# Patient Record
Sex: FEMALE | Race: WHITE | Employment: FULL TIME | URBAN - METROPOLITAN AREA
[De-identification: names, ages, dates, MRNs, and addresses within clinical notes are randomized per-mention and may not be internally consistent; named-entity substitution may affect disease eponyms.]

---

## 2017-10-03 ENCOUNTER — HOSPITAL ENCOUNTER (OUTPATIENT)
Dept: ULTRASOUND IMAGING | Age: 32
Discharge: HOME | End: 2017-10-03
Attending: OBSTETRICS & GYNECOLOGY
Payer: COMMERCIAL

## 2017-10-03 DIAGNOSIS — O20.0 ABORTION, THREATENED: ICD-10-CM

## 2017-10-03 PROCEDURE — 76801 OB US < 14 WKS SINGLE FETUS: CPT | Performed by: OBSTETRICS & GYNECOLOGY

## 2017-10-03 PROCEDURE — 76817 TRANSVAGINAL US OBSTETRIC: CPT | Performed by: OBSTETRICS & GYNECOLOGY

## 2017-10-11 ENCOUNTER — HOSPITAL ENCOUNTER (OUTPATIENT)
Dept: ULTRASOUND IMAGING | Age: 32
Discharge: HOME | End: 2017-10-11
Attending: OBSTETRICS & GYNECOLOGY
Payer: COMMERCIAL

## 2017-10-11 DIAGNOSIS — O20.0 ABORTION, SPONTANEOUS THREATENED: ICD-10-CM

## 2017-10-11 PROCEDURE — 76817 TRANSVAGINAL US OBSTETRIC: CPT | Performed by: OBSTETRICS & GYNECOLOGY

## 2017-10-11 PROCEDURE — 76801 OB US < 14 WKS SINGLE FETUS: CPT | Performed by: OBSTETRICS & GYNECOLOGY

## 2017-10-26 ENCOUNTER — HOSPITAL ENCOUNTER (EMERGENCY)
Age: 32
Discharge: HOME | End: 2017-10-27
Attending: EMERGENCY MEDICINE
Payer: COMMERCIAL

## 2017-10-26 DIAGNOSIS — N93.9 VAGINAL BLEEDING: Primary | ICD-10-CM

## 2017-10-26 PROCEDURE — 86900 BLOOD TYPING SEROLOGIC ABO: CPT | Performed by: EMERGENCY MEDICINE

## 2017-10-26 PROCEDURE — 99284 EMERGENCY DEPT VISIT MOD MDM: CPT

## 2017-10-26 PROCEDURE — 85025 COMPLETE CBC W/AUTO DIFF WBC: CPT | Performed by: EMERGENCY MEDICINE

## 2017-10-26 PROCEDURE — 96361 HYDRATE IV INFUSION ADD-ON: CPT

## 2017-10-26 PROCEDURE — 84702 CHORIONIC GONADOTROPIN TEST: CPT | Performed by: EMERGENCY MEDICINE

## 2017-10-26 PROCEDURE — 86901 BLOOD TYPING SEROLOGIC RH(D): CPT | Performed by: EMERGENCY MEDICINE

## 2017-10-26 PROCEDURE — 96360 HYDRATION IV INFUSION INIT: CPT

## 2017-10-26 RX ORDER — SODIUM CHLORIDE 9 MG/ML
125 INJECTION, SOLUTION INTRAVENOUS CONTINUOUS
Status: DISCONTINUED | OUTPATIENT
Start: 2017-10-26 | End: 2017-10-27

## 2017-10-27 ENCOUNTER — APPOINTMENT (OUTPATIENT)
Dept: ULTRASOUND IMAGING | Age: 32
End: 2017-10-27
Attending: EMERGENCY MEDICINE
Payer: COMMERCIAL

## 2017-10-27 VITALS
DIASTOLIC BLOOD PRESSURE: 57 MMHG | WEIGHT: 150 LBS | HEART RATE: 76 BPM | TEMPERATURE: 99 F | SYSTOLIC BLOOD PRESSURE: 95 MMHG | RESPIRATION RATE: 16 BRPM | BODY MASS INDEX: 24 KG/M2 | OXYGEN SATURATION: 100 %

## 2017-10-27 PROCEDURE — 76801 OB US < 14 WKS SINGLE FETUS: CPT | Performed by: EMERGENCY MEDICINE

## 2017-10-27 NOTE — ED PROVIDER NOTES
Patient Seen in: THE [facility] Emergency Department In [place]    History   Patient presents with:  Pregnancy Issues (gynecologic)    Stated Complaint: 10 weeks pregnant, bleeding    HPI    This is a 13-year-old G2 , 10 weeks pregnant by ultrasound date Physical Exam    GENERAL: Awake, alert oriented x3, nontoxic appearing. SKIN: Normal, warm, and dry. HEENT:  Pupils equally round and reactive to light. Conjuctiva clear.   Oropharynx is clear and moist.   Lungs: Clear to auscultation bilaterally Eleanor Levy who agrees with current management. She will be on pelvic rest.  Discharged home. Return if worse. Follow-up tomorrow morning the office as scheduled. Patient was discharged home in good condition with her .       Disposition and Plan

## 2017-12-04 ENCOUNTER — OFFICE VISIT (OUTPATIENT)
Dept: PERINATAL CARE | Facility: HOSPITAL | Age: 32
End: 2017-12-04
Attending: OBSTETRICS & GYNECOLOGY
Payer: COMMERCIAL

## 2017-12-04 VITALS
SYSTOLIC BLOOD PRESSURE: 113 MMHG | BODY MASS INDEX: 25.71 KG/M2 | HEIGHT: 66 IN | DIASTOLIC BLOOD PRESSURE: 64 MMHG | HEART RATE: 77 BPM | WEIGHT: 160 LBS

## 2017-12-04 DIAGNOSIS — Z3A.15 15 WEEKS GESTATION OF PREGNANCY: ICD-10-CM

## 2017-12-04 DIAGNOSIS — Z20.821 ZIKA VIRUS EXPOSURE: ICD-10-CM

## 2017-12-04 PROCEDURE — 99242 OFF/OP CONSLTJ NEW/EST SF 20: CPT | Performed by: OBSTETRICS & GYNECOLOGY

## 2017-12-04 NOTE — PROGRESS NOTES
Vernita Eisenmenger is a 33year old female. Reason for Consult:   Possible zika exposure. Traveled to [facility] about 10wks ago. Does not remember mosquito bites. No rash, fever or illness.     Review of History:     OB History:    Obstetric History   G3 1 per week     Comment: 3 drinks a month       NARRATIVE:     /64   Pulse 77   Ht 5' 6\" (1.676 m)   Wt 160 lb (72.6 kg)   LMP 08/17/2017   BMI 25.82 kg/m²           Alert and Oriented.   No acute distress           Zika virus may be transmitted to hu travelers returning to the [facility] from an area with [place] should take steps to prevent mosquito bites for 3 weeks so they do not spread Zika to uninfected mosquitoes.  Based on experience with other flaviviruses, IgM antibodies will be expected to be an area where mosquito-borne transmission of Zika virus infection has been reported       Current Public Health guidelines for Zika testing in pregnancy include:     For asymptomatic pregnant women who have traveled to areas with [facility] travel notices, RN should consider RNA ESTEFANÍA testing of amniocentesis specimens and results should be interpreted within the context of the limitations of amniotic fluid testing.      [facility]  CDC recommends Zika virus testing for anyone who is not pr

## 2017-12-29 NOTE — PROGRESS NOTES
Lydia Shields    Dear Dr. Juancarlos Keyes    Thank you for requesting ultrasound evaluation and maternal fetal medicine consultation on your patient Michelle Triana.   As you are aware she is a 33year old female  with a [employer] [place] [employer] Placenta: anterior. Fetal Anatomy:  Visualized with normal appearance: head, face, spine, neck, skin, chest, abdominal wall, gastrointestinal tract, kidneys, bladder, extremities.     Brain: Visualized and normal appearance: brain parenchyma, cerebral assess growth. If fetal growth restriction is detected, additional fetal monitoring is indicated.      IMPRESSION:  · IUP at 19w5d  · Possible Zika Virus Exposure - normal fetal CNS anatomy  · Single Umbilical Artery    RECOMMENDATIONS:  · Continue care wi

## 2018-01-02 ENCOUNTER — OFFICE VISIT (OUTPATIENT)
Dept: PERINATAL CARE | Facility: HOSPITAL | Age: 33
End: 2018-01-02
Attending: OBSTETRICS & GYNECOLOGY
Payer: COMMERCIAL

## 2018-01-02 VITALS
SYSTOLIC BLOOD PRESSURE: 118 MMHG | HEART RATE: 85 BPM | DIASTOLIC BLOOD PRESSURE: 68 MMHG | WEIGHT: 165 LBS | BODY MASS INDEX: 27 KG/M2

## 2018-01-02 DIAGNOSIS — Z20.821 ZIKA VIRUS EXPOSURE AFFECTING PREGNANCY: ICD-10-CM

## 2018-01-02 DIAGNOSIS — O99.891 ZIKA VIRUS EXPOSURE AFFECTING PREGNANCY: ICD-10-CM

## 2018-01-02 PROCEDURE — 76811 OB US DETAILED SNGL FETUS: CPT | Performed by: OBSTETRICS & GYNECOLOGY

## 2018-01-02 PROCEDURE — 99215 OFFICE O/P EST HI 40 MIN: CPT | Performed by: OBSTETRICS & GYNECOLOGY

## 2018-03-26 NOTE — PROGRESS NOTES
[facility]  Dear Dr. Irena Douglas     Thank you for requesting ultrasound evaluation and maternal fetal medicine consultation on your patient Kiah Antunez.   As you are aware she is a 33year old female  with a Si tract, kidneys, bladder. Brain: Visualized and normal appearance: brain parenchyma, choroid plexus, cerebellum, posterior fossa, cavum septi pellucidi.   Spine: appears normal but suboptimal  Heart: Visualized and normal appearance: four-chamber view, le

## 2018-03-27 ENCOUNTER — OFFICE VISIT (OUTPATIENT)
Dept: PERINATAL CARE | Facility: HOSPITAL | Age: 33
End: 2018-03-27
Attending: OBSTETRICS & GYNECOLOGY
Payer: COMMERCIAL

## 2018-03-27 VITALS
DIASTOLIC BLOOD PRESSURE: 66 MMHG | SYSTOLIC BLOOD PRESSURE: 120 MMHG | HEART RATE: 93 BPM | BODY MASS INDEX: 28 KG/M2 | WEIGHT: 175 LBS

## 2018-03-27 DIAGNOSIS — O99.891 ZIKA VIRUS EXPOSURE AFFECTING PREGNANCY: ICD-10-CM

## 2018-03-27 DIAGNOSIS — Z20.821 ZIKA VIRUS EXPOSURE AFFECTING PREGNANCY: ICD-10-CM

## 2018-03-27 DIAGNOSIS — O09.899 SINGLE UMBILICAL ARTERY AFFECTING MANAGEMENT OF MOTHER IN SINGLETON PREGNANCY, ANTEPARTUM: ICD-10-CM

## 2018-03-27 PROCEDURE — 76805 OB US >/= 14 WKS SNGL FETUS: CPT | Performed by: OBSTETRICS & GYNECOLOGY

## 2018-03-27 PROCEDURE — 99213 OFFICE O/P EST LOW 20 MIN: CPT | Performed by: OBSTETRICS & GYNECOLOGY

## 2018-03-27 PROCEDURE — 76819 FETAL BIOPHYS PROFIL W/O NST: CPT | Performed by: OBSTETRICS & GYNECOLOGY

## 2018-04-17 ENCOUNTER — HOSPITAL ENCOUNTER (OUTPATIENT)
Facility: HOSPITAL | Age: 33
Setting detail: OBSERVATION
Discharge: HOME | End: 2018-04-17
Attending: OBSTETRICS & GYNECOLOGY | Admitting: OBSTETRICS & GYNECOLOGY
Payer: COMMERCIAL

## 2018-04-17 VITALS
DIASTOLIC BLOOD PRESSURE: 66 MMHG | TEMPERATURE: 98 F | BODY MASS INDEX: 29.99 KG/M2 | WEIGHT: 180 LBS | HEIGHT: 65 IN | SYSTOLIC BLOOD PRESSURE: 110 MMHG | HEART RATE: 88 BPM

## 2018-04-17 PROCEDURE — 81001 URINALYSIS AUTO W/SCOPE: CPT | Performed by: OBSTETRICS & GYNECOLOGY

## 2018-04-17 PROCEDURE — 96372 THER/PROPH/DIAG INJ SC/IM: CPT

## 2018-04-17 RX ORDER — TERBUTALINE SULFATE 1 MG/ML
0.25 INJECTION, SOLUTION SUBCUTANEOUS
Status: DISPENSED | OUTPATIENT
Start: 2018-04-17 | End: 2018-04-17

## 2018-04-17 NOTE — PROGRESS NOTES
Pt is a 33year old female admitted to [facility]. Patient presents with:  R/o  Labor     Pt is  34w5d intra-uterine pregnancy. History obtained, consents signed. Oriented to room, staff, and plan of care.

## 2018-04-18 NOTE — DISCHARGE SUMMARY
[facility]    PATIENT'S NAME: Griselda Slough D   ATTENDING PHYSICIAN: Tierney Jaramillo D.O.   PATIENT ACCOUNT#:   [de-identified]    LOCATION:  [address] [place] [address]  MEDICAL RECORD #:   [id number]       YOB: 1985  ADMISSION DATE:       04/17/20

## 2018-04-20 ENCOUNTER — HOSPITAL ENCOUNTER (OUTPATIENT)
Facility: HOSPITAL | Age: 33
Setting detail: OBSERVATION
Discharge: HOME | End: 2018-04-20
Attending: OBSTETRICS & GYNECOLOGY | Admitting: OBSTETRICS & GYNECOLOGY
Payer: COMMERCIAL

## 2018-04-20 VITALS
OXYGEN SATURATION: 98 % | HEIGHT: 65 IN | DIASTOLIC BLOOD PRESSURE: 64 MMHG | TEMPERATURE: 98 F | SYSTOLIC BLOOD PRESSURE: 103 MMHG | HEART RATE: 90 BPM | BODY MASS INDEX: 30.82 KG/M2 | WEIGHT: 185 LBS

## 2018-04-20 PROCEDURE — 59025 FETAL NON-STRESS TEST: CPT

## 2018-04-20 PROCEDURE — 81002 URINALYSIS NONAUTO W/O SCOPE: CPT

## 2018-04-20 PROCEDURE — 96360 HYDRATION IV INFUSION INIT: CPT

## 2018-04-20 PROCEDURE — 96372 THER/PROPH/DIAG INJ SC/IM: CPT

## 2018-04-20 RX ORDER — NIFEDIPINE 10 MG/1
10 CAPSULE ORAL EVERY 4 HOURS
Status: ON HOLD | COMMUNITY
End: 2018-05-04

## 2018-04-20 RX ORDER — BETAMETHASONE SODIUM PHOSPHATE AND BETAMETHASONE ACETATE 3; 3 MG/ML; MG/ML
12 INJECTION, SUSPENSION INTRA-ARTICULAR; INTRALESIONAL; INTRAMUSCULAR; SOFT TISSUE EVERY 24 HOURS
Status: DISCONTINUED | OUTPATIENT
Start: 2018-04-20 | End: 2018-04-20

## 2018-04-20 RX ORDER — TERBUTALINE SULFATE 1 MG/ML
0.25 INJECTION, SOLUTION SUBCUTANEOUS ONCE
Status: COMPLETED | OUTPATIENT
Start: 2018-04-20 | End: 2018-04-20

## 2018-04-21 ENCOUNTER — HOSPITAL ENCOUNTER (OUTPATIENT)
Facility: HOSPITAL | Age: 33
Discharge: HOME | End: 2018-04-21
Attending: OBSTETRICS & GYNECOLOGY | Admitting: OBSTETRICS & GYNECOLOGY
Payer: COMMERCIAL

## 2018-04-21 PROCEDURE — 96372 THER/PROPH/DIAG INJ SC/IM: CPT

## 2018-04-21 RX ORDER — BETAMETHASONE SODIUM PHOSPHATE AND BETAMETHASONE ACETATE 3; 3 MG/ML; MG/ML
12 INJECTION, SUSPENSION INTRA-ARTICULAR; INTRALESIONAL; INTRAMUSCULAR; SOFT TISSUE ONCE
Status: COMPLETED | OUTPATIENT
Start: 2018-04-21 | End: 2018-04-21

## 2018-04-21 NOTE — PROGRESS NOTES
Cervical exam - closed /50%/-3 station posterior, pt did not have contraction last hour, pt feeling comfortable . Updated  orders received to discharge pt home and come to triage tomorrow for 2nd dose of steriod.

## 2018-04-21 NOTE — NST
Nonstress Test   Patient: Maddison Meadows    Gestation: 35w1d    NST:       Variability: Moderate           Accelerations: Yes           Decelerations: None            Baseline: 140 BPM           Uterine Irritability: Yes           Contractions: Priscilla Willoughby

## 2018-04-21 NOTE — PROGRESS NOTES
Discharge instruction given to pt , pt going home ambulatory accompanied with spouse in stable condition, all pt belongings sent with pt on discharge

## 2018-04-21 NOTE — PROGRESS NOTES
Pt is   35.1 GA sent from office by Andres Boxer for  contraction evaluation  EFM and Hector applied, pt says her contraction is very mild compared to earlier today.   Updated  about the pt status, orders received for IV hydration, terb x 1

## 2018-04-22 NOTE — PROGRESS NOTES
Pt in triage for second betamethasone shot. Pt denies any pregnancy concerns at this time. Betamethasone education reviewed with patient. Pt given IM shot  of betamethasone in right upper out gluteus. Pt left ambulatory.

## 2018-05-04 ENCOUNTER — APPOINTMENT (OUTPATIENT)
Dept: ULTRASOUND IMAGING | Facility: HOSPITAL | Age: 33
End: 2018-05-04
Attending: OBSTETRICS & GYNECOLOGY
Payer: COMMERCIAL

## 2018-05-04 ENCOUNTER — HOSPITAL ENCOUNTER (OUTPATIENT)
Facility: HOSPITAL | Age: 33
Setting detail: OBSERVATION
Discharge: HOME | End: 2018-05-04
Attending: OBSTETRICS & GYNECOLOGY | Admitting: OBSTETRICS & GYNECOLOGY
Payer: COMMERCIAL

## 2018-05-04 VITALS
HEIGHT: 65 IN | TEMPERATURE: 98 F | RESPIRATION RATE: 16 BRPM | BODY MASS INDEX: 31.65 KG/M2 | DIASTOLIC BLOOD PRESSURE: 69 MMHG | HEART RATE: 86 BPM | SYSTOLIC BLOOD PRESSURE: 120 MMHG | WEIGHT: 190 LBS

## 2018-05-04 PROCEDURE — 76819 FETAL BIOPHYS PROFIL W/O NST: CPT | Performed by: OBSTETRICS & GYNECOLOGY

## 2018-05-04 PROCEDURE — 59025 FETAL NON-STRESS TEST: CPT

## 2018-05-04 PROCEDURE — 81002 URINALYSIS NONAUTO W/O SCOPE: CPT

## 2018-05-04 NOTE — NST
Nonstress Test   Patient: Kalyan Hurd    Gestation: 37w1d    NST:       Variability: Moderate           Accelerations: Yes           Decelerations: None            Baseline: 120 BPM           Uterine Irritability: No           Contractions: Regular

## 2018-05-04 NOTE — PROGRESS NOTES
Pt is a  at 37 1/7 wk iup who is brought to room triage-5 for BPP and NST. Pt was in office today for growth u/s and BPP was noted to be 6. Pt sent to L&D for further eval. UA obtained. EFM tested and applied. POC discussed and questions answered.  Pt

## 2018-05-04 NOTE — PROGRESS NOTES
Pt is d/c'd home in stable condition. Both written and verbal instructions provided. Kick counts reviewed. Pt instructed to call if any concerns regarding fetal movement. Questions answered. Pt verbalizes understanding and agreement.

## 2018-05-11 ENCOUNTER — HOSPITAL ENCOUNTER (INPATIENT)
Facility: HOSPITAL | Age: 33
LOS: 2 days | Discharge: HOME | End: 2018-05-13
Attending: OBSTETRICS & GYNECOLOGY | Admitting: OBSTETRICS & GYNECOLOGY
Payer: COMMERCIAL

## 2018-05-11 ENCOUNTER — APPOINTMENT (OUTPATIENT)
Dept: ULTRASOUND IMAGING | Facility: HOSPITAL | Age: 33
End: 2018-05-11
Attending: OBSTETRICS & GYNECOLOGY
Payer: COMMERCIAL

## 2018-05-11 PROBLEM — O09.899 SINGLE UMBILICAL ARTERY AFFECTING MANAGEMENT OF MOTHER IN SINGLETON PREGNANCY, ANTEPARTUM: Status: RESOLVED | Noted: 2018-03-27 | Resolved: 2018-05-11

## 2018-05-11 PROBLEM — O99.891 ZIKA VIRUS EXPOSURE AFFECTING PREGNANCY: Status: RESOLVED | Noted: 2018-01-02 | Resolved: 2018-05-11

## 2018-05-11 PROBLEM — Z20.821 ZIKA VIRUS EXPOSURE AFFECTING PREGNANCY: Status: RESOLVED | Noted: 2018-01-02 | Resolved: 2018-05-11

## 2018-05-11 PROCEDURE — 86850 RBC ANTIBODY SCREEN: CPT | Performed by: OBSTETRICS & GYNECOLOGY

## 2018-05-11 PROCEDURE — 0UQGXZZ REPAIR VAGINA, EXTERNAL APPROACH: ICD-10-PCS | Performed by: OBSTETRICS & GYNECOLOGY

## 2018-05-11 PROCEDURE — 85027 COMPLETE CBC AUTOMATED: CPT | Performed by: OBSTETRICS & GYNECOLOGY

## 2018-05-11 PROCEDURE — 0UQKXZZ REPAIR HYMEN, EXTERNAL APPROACH: ICD-10-PCS | Performed by: OBSTETRICS & GYNECOLOGY

## 2018-05-11 PROCEDURE — 86901 BLOOD TYPING SEROLOGIC RH(D): CPT | Performed by: OBSTETRICS & GYNECOLOGY

## 2018-05-11 PROCEDURE — 76819 FETAL BIOPHYS PROFIL W/O NST: CPT | Performed by: OBSTETRICS & GYNECOLOGY

## 2018-05-11 PROCEDURE — 86900 BLOOD TYPING SEROLOGIC ABO: CPT | Performed by: OBSTETRICS & GYNECOLOGY

## 2018-05-11 PROCEDURE — 88307 TISSUE EXAM BY PATHOLOGIST: CPT | Performed by: OBSTETRICS & GYNECOLOGY

## 2018-05-11 PROCEDURE — 86780 TREPONEMA PALLIDUM: CPT | Performed by: OBSTETRICS & GYNECOLOGY

## 2018-05-11 PROCEDURE — 81002 URINALYSIS NONAUTO W/O SCOPE: CPT

## 2018-05-11 RX ORDER — SODIUM CHLORIDE, SODIUM LACTATE, POTASSIUM CHLORIDE, CALCIUM CHLORIDE 600; 310; 30; 20 MG/100ML; MG/100ML; MG/100ML; MG/100ML
INJECTION, SOLUTION INTRAVENOUS CONTINUOUS
Status: DISCONTINUED | OUTPATIENT
Start: 2018-05-11 | End: 2018-05-12 | Stop reason: HOSPADM

## 2018-05-11 RX ORDER — IBUPROFEN 600 MG/1
600 TABLET ORAL ONCE AS NEEDED
Status: DISCONTINUED | OUTPATIENT
Start: 2018-05-11 | End: 2018-05-12 | Stop reason: HOSPADM

## 2018-05-11 RX ORDER — BISACODYL 10 MG
10 SUPPOSITORY, RECTAL RECTAL ONCE AS NEEDED
Status: DISCONTINUED | OUTPATIENT
Start: 2018-05-11 | End: 2018-05-13

## 2018-05-11 RX ORDER — EPHEDRINE SULFATE 50 MG/ML
5 INJECTION, SOLUTION INTRAVENOUS AS NEEDED
Status: DISCONTINUED | OUTPATIENT
Start: 2018-05-11 | End: 2018-05-13

## 2018-05-11 RX ORDER — NALBUPHINE HCL 10 MG/ML
2.5 AMPUL (ML) INJECTION
Status: DISCONTINUED | OUTPATIENT
Start: 2018-05-11 | End: 2018-05-13

## 2018-05-11 RX ORDER — SIMETHICONE 80 MG
80 TABLET,CHEWABLE ORAL 3 TIMES DAILY PRN
Status: DISCONTINUED | OUTPATIENT
Start: 2018-05-11 | End: 2018-05-13

## 2018-05-11 RX ORDER — TRISODIUM CITRATE DIHYDRATE AND CITRIC ACID MONOHYDRATE 500; 334 MG/5ML; MG/5ML
30 SOLUTION ORAL AS NEEDED
Status: DISCONTINUED | OUTPATIENT
Start: 2018-05-11 | End: 2018-05-12 | Stop reason: HOSPADM

## 2018-05-11 RX ORDER — IBUPROFEN 600 MG/1
600 TABLET ORAL EVERY 6 HOURS
Status: DISCONTINUED | OUTPATIENT
Start: 2018-05-12 | End: 2018-05-13

## 2018-05-11 RX ORDER — HYDROCODONE BITARTRATE AND ACETAMINOPHEN 5; 325 MG/1; MG/1
1 TABLET ORAL EVERY 4 HOURS PRN
Status: DISCONTINUED | OUTPATIENT
Start: 2018-05-11 | End: 2018-05-13

## 2018-05-11 RX ORDER — OXYTOCIN 10 [USP'U]/ML
INJECTION, SOLUTION INTRAMUSCULAR; INTRAVENOUS
Status: COMPLETED
Start: 2018-05-11 | End: 2018-05-11

## 2018-05-11 RX ORDER — DEXTROSE, SODIUM CHLORIDE, SODIUM LACTATE, POTASSIUM CHLORIDE, AND CALCIUM CHLORIDE 5; .6; .31; .03; .02 G/100ML; G/100ML; G/100ML; G/100ML; G/100ML
INJECTION, SOLUTION INTRAVENOUS AS NEEDED
Status: DISCONTINUED | OUTPATIENT
Start: 2018-05-11 | End: 2018-05-12 | Stop reason: HOSPADM

## 2018-05-11 RX ORDER — DOCUSATE SODIUM 100 MG/1
100 CAPSULE, LIQUID FILLED ORAL
Status: DISCONTINUED | OUTPATIENT
Start: 2018-05-11 | End: 2018-05-13

## 2018-05-11 RX ORDER — HYDROCODONE BITARTRATE AND ACETAMINOPHEN 5; 325 MG/1; MG/1
2 TABLET ORAL EVERY 4 HOURS PRN
Status: DISCONTINUED | OUTPATIENT
Start: 2018-05-11 | End: 2018-05-13

## 2018-05-11 RX ORDER — TERBUTALINE SULFATE 1 MG/ML
0.25 INJECTION, SOLUTION SUBCUTANEOUS AS NEEDED
Status: DISCONTINUED | OUTPATIENT
Start: 2018-05-11 | End: 2018-05-12 | Stop reason: HOSPADM

## 2018-05-11 RX ORDER — ACETAMINOPHEN 325 MG/1
650 TABLET ORAL EVERY 4 HOURS PRN
Status: DISCONTINUED | OUTPATIENT
Start: 2018-05-11 | End: 2018-05-13

## 2018-05-12 PROCEDURE — 85025 COMPLETE CBC W/AUTO DIFF WBC: CPT | Performed by: OBSTETRICS & GYNECOLOGY

## 2018-05-12 NOTE — CONSULTS
[facility]    PATIENT'S NAME: Jean Russo   ATTENDING PHYSICIAN: RAMOS Rickedee Medin: Adams Colmenares D.O.   PATIENT ACCOUNT#:   [de-identified]    LOCATION:  [address] [place] [address]  MEDICAL RECORD #:   [id number]       DATE OF BIRTH:

## 2018-05-12 NOTE — PROGRESS NOTES
Pt transferred  to Mother Baby room 2207 in stable condition. Report given to [facility], RN. Infant transferred with mother in stable condition.

## 2018-05-12 NOTE — PROGRESS NOTES
Dr. Ladarius Zimmerman notified of patient SVE and pitocin dosage.  Patient is currently comfortable with epidural.POC will be to ROM and continue with augmentation per MD. Will continue to monitor

## 2018-05-12 NOTE — H&P
[facility]    PATIENT'S NAME: Reed Lee   ATTENDING PHYSICIAN: Irma Tyler M.D.    PATIENT ACCOUNT#:   [de-identified]    LOCATION:  [address] [place] [address]  MEDICAL RECORD #:   [id number]       YOB: 1985  ADMISSION DATE:       05/11/201 sounds x4. No guarding, no rebound. NEUROLOGIC:  Her DTRs +2/4. Negative for clonus. PELVIC:  Cervix was 4 to 5, 80%, -2, bag of water broke; copious amounts of fluid.     LABORATORY DATA:  This pregnancy, her blood type A positive, antibody negative, R

## 2018-05-13 VITALS
BODY MASS INDEX: 33.32 KG/M2 | RESPIRATION RATE: 18 BRPM | SYSTOLIC BLOOD PRESSURE: 113 MMHG | WEIGHT: 200 LBS | HEIGHT: 65 IN | OXYGEN SATURATION: 97 % | HEART RATE: 70 BPM | TEMPERATURE: 98 F | DIASTOLIC BLOOD PRESSURE: 68 MMHG

## 2018-05-13 PROCEDURE — 85025 COMPLETE CBC W/AUTO DIFF WBC: CPT | Performed by: OBSTETRICS & GYNECOLOGY

## 2018-05-13 NOTE — PROGRESS NOTES
Baby breast and bottlefeeding well, all dc teaching reviewed earlier and all questions answered. Pt states comfortable caring for self and baby. Discharged in stable condition with family and accompanied by staff at [phone] per ambulatory.

## 2018-05-13 NOTE — PLAN OF CARE
POSTPARTUM    • Long Term Goal:Experiences normal postpartum course Progressing    • Optimize infant feeding at the breast Progressing    • Appropriate maternal -  bonding Progressing      Both breast and bottle feeding infant. Pumping occasionally.

## 2018-05-14 NOTE — L&D DELIVERY NOTE
[facility]    PATIENT'S NAME: Aron Jacques   ATTENDING PHYSICIAN: Dillon Watts M.D.    PATIENT ACCOUNT #: [de-identified] LOCATION:  [address] [place] [address]   MEDICAL RECORD #: [id number] YOB: 1985   ADMISSION DATE: 05/11/2018 DELIVERY DATE: By Jeff Jurado D.O.  d: 05/11/2018 45:55:83  t: 05/11/2018 63:79:70  Job [id number]  /

## 2018-05-14 NOTE — DISCHARGE SUMMARY
[facility]    PATIENT'S NAME: Ledy Borja   ATTENDING PHYSICIAN: Wanda Ge M.D.    PATIENT ACCOUNT#:   [de-identified]    LOCATION:  [address] [place] [address]  MEDICAL RECORD #:   [id number]       YOB: 1985  ADMISSION DATE:       05/11/20

## 2018-05-15 ENCOUNTER — TELEPHONE (OUTPATIENT)
Dept: OBGYN UNIT | Facility: HOSPITAL | Age: 33
End: 2018-05-15

## 2018-05-16 ENCOUNTER — NURSE ONLY (OUTPATIENT)
Dept: LACTATION | Facility: HOSPITAL | Age: 33
End: 2018-05-16
Attending: OBSTETRICS & GYNECOLOGY
Payer: COMMERCIAL

## 2018-05-16 VITALS — TEMPERATURE: 98 F

## 2018-05-16 DIAGNOSIS — Z91.89 BREASTFEEDING PROBLEM: ICD-10-CM

## 2018-05-16 DIAGNOSIS — O92.5 SUPPRESSED LACTATION, POSTPARTUM CONDITION OR COMPLICATION: Primary | ICD-10-CM

## 2018-05-16 PROCEDURE — 99213 OFFICE O/P EST LOW 20 MIN: CPT

## 2018-05-16 NOTE — PATIENT INSTRUCTIONS
Breastfeeding suggestions to increase milk supply    Full evaluation of your current milk supply and your infant's transfer of breastmilk is important prior to initiation of mediations.     Review of your history and treatment of any medical conditions by antionette before offering formula. · Continue to pump both breasts for 10-15 minutes anytime a supplement is needed. A hospital grade rental pump is recommended.     Discuss the following medications with your physician and your baby's physician:  As with any medic breastmilk? · Swallowing with most sucks (every 1-3 sucks) until satisfied at least 8-12 times every 24 hours. · Compressing the breast when your baby sucks can increase milk flow.   · At least 6-8 wet diapers and at least 3-4 soft, yellow seedy stools ev · Appointment with baby's doctor planned        Call you baby's doctor with questions as well. Weight check sooner if wet or stool diapers decrease. Have weight checked again within 1-3 days of decreasing/stopping supplements.      For additional infor to open wide like a “yawn,” with the tongue down and out over the lower lip. Bring baby’s mouth directly over the shield. It may take a few attempts before your baby settles into a rhythmical suckling pattern.   • After several minutes of regular swallowing rinse and air dry. The shield may be boiled. Follow-up is VERY important! • Let your baby’s health care provider know immediately if it is difficult for you to feed your baby or if the number of wet or stool diapers is inadequate.    • Follow-up visits flange for your nipple size. Nipple should not rub on inner circumference of flange. If you need a different size flange, contact your nurse or the lactation department. • Maximum comfort suction is recommended.  Begin with suction low then increase the s thirst.  • View the [address]: Maximizing Milk Production and Hand Expression   [email or web address]/[email or web address]. html    Include night feedings and/or pumping sessions.  Your hormone levels are higher at [facility]

## 2018-07-31 ENCOUNTER — OFFICE VISIT (OUTPATIENT)
Dept: SURGERY | Facility: CLINIC | Age: 33
End: 2018-07-31
Payer: COMMERCIAL

## 2018-07-31 VITALS
WEIGHT: 167 LBS | BODY MASS INDEX: 28 KG/M2 | RESPIRATION RATE: 15 BRPM | SYSTOLIC BLOOD PRESSURE: 122 MMHG | DIASTOLIC BLOOD PRESSURE: 72 MMHG | HEART RATE: 77 BPM

## 2018-07-31 DIAGNOSIS — K42.9 UMBILICAL HERNIA WITHOUT OBSTRUCTION AND WITHOUT GANGRENE: Primary | ICD-10-CM

## 2018-07-31 PROCEDURE — 99243 OFF/OP CNSLTJ NEW/EST LOW 30: CPT | Performed by: SURGERY

## 2018-07-31 NOTE — H&P
Shelbie Santamaria is a 29year old female  Patient presents with:  Umbilical Hernia      REFERRED BY    Patient presents with umbilical hernia. Patient states that she has been having abdominal pain at the umbilicus for the past 6 weeks.   She started wear colored urine  MUSCULOSKELETAL: no joint complaints upper or lower extremities  HEMATOLOGY: denies hx anemia; denies bruising or excessive bleeding  Endocrine: no weight gain or loss no hot or cold intolerance    EXAM     Blood pressure 122/72, pulse 77, r • HGB 05/11/2018 11.8* 12.0 - 16.0 g/dL Final   • HCT 05/11/2018 37.1  34.0 - 50.0 % Final   • PLT 05/11/2018 282.0  150.0 - 450.0 10(3)uL Final   • MCV 05/11/2018 84.3  81.0 - 100.0 fL Final   • MCH 05/11/2018 26.8* 27.0 - 33.2 pg Final   • MCHC 05/11/2 - 450.0 10(3)uL Final   • MCV 05/12/2018 83.6  81.0 - 100.0 fL Final   • MCH 05/12/2018 27.0  27.0 - 33.2 pg Final   • MCHC 05/12/2018 32.2  31.0 - 37.0 g/dL Final   • RDW 05/12/2018 14.6  11.5 - 16.0 % Final   • RDW-SD 05/12/2018 44.2  35.1 - 46.3 fL Elicia 05/13/2018 0.03  0.00 - 1.00 x10(3) uL Final   • Neutrophil % 05/13/2018 52.3  % Final   • Lymphocyte % 05/13/2018 39.5  % Final   • Monocyte % 05/13/2018 6.1  % Final   • Eosinophil % 05/13/2018 1.6  % Final   • Basophil % 05/13/2018 0.2  % Final   • Carlota

## 2018-08-21 ENCOUNTER — TELEPHONE (OUTPATIENT)
Dept: FAMILY MEDICINE CLINIC | Facility: CLINIC | Age: 33
End: 2018-08-21

## 2018-08-21 NOTE — TELEPHONE ENCOUNTER
Patient is calling to schedule surgery for her hernia. Has been seen for a consult already. Please call back to schedule.

## 2018-08-21 NOTE — TELEPHONE ENCOUNTER
Umbilical herniorrhaphy with possible mesh scheduled 9-14-18 [facility].  Surgery schedule sheet e-mailed to surgery scheduler. Instructions reviewed with patient verbally. Entered in [address] [place] [address] Azalea GUILLEN. Need to precert. Mail instructions to patient.

## 2018-08-22 ENCOUNTER — TELEPHONE (OUTPATIENT)
Dept: SURGERY | Facility: CLINIC | Age: 33
End: 2018-08-22

## 2018-08-22 ENCOUNTER — TELEPHONE (OUTPATIENT)
Dept: FAMILY MEDICINE CLINIC | Facility: CLINIC | Age: 33
End: 2018-08-22

## 2018-08-22 DIAGNOSIS — K42.9 UMBILICAL HERNIA WITHOUT OBSTRUCTION OR GANGRENE: Primary | ICD-10-CM

## 2018-08-22 NOTE — TELEPHONE ENCOUNTER
I received the paperwork from [facility] stating that the surgery is approved.   Again Lety Zaman number is [id number]

## 2018-08-22 NOTE — TELEPHONE ENCOUNTER
CPT:  33290  SX:  K42.9      Calling [facility] for prior auth for Umbilical Herniorrhaphy. There is prior auth required for this out patient surgery. I spoke with Cece Perez at [facility]. It was auto approved while on the phone with Ceec Perez.   The authorization number is [id number]

## 2018-09-14 ENCOUNTER — HOSPITAL ENCOUNTER (OUTPATIENT)
Facility: HOSPITAL | Age: 33
Setting detail: HOSPITAL OUTPATIENT SURGERY
Discharge: HOME | End: 2018-09-14
Attending: SURGERY | Admitting: SURGERY
Payer: COMMERCIAL

## 2018-09-14 VITALS
HEIGHT: 66 IN | OXYGEN SATURATION: 100 % | HEART RATE: 80 BPM | SYSTOLIC BLOOD PRESSURE: 135 MMHG | TEMPERATURE: 98 F | WEIGHT: 159.63 LBS | DIASTOLIC BLOOD PRESSURE: 89 MMHG | RESPIRATION RATE: 16 BRPM | BODY MASS INDEX: 25.66 KG/M2

## 2018-09-14 DIAGNOSIS — K42.9 UMBILICAL HERNIA WITHOUT OBSTRUCTION OR GANGRENE: ICD-10-CM

## 2018-09-14 LAB
POCT LOT NUMBER: NORMAL
POCT URINE PREGNANCY: NEGATIVE

## 2018-09-14 PROCEDURE — 81025 URINE PREGNANCY TEST: CPT | Performed by: SURGERY

## 2018-09-14 PROCEDURE — 0WUF0JZ SUPPLEMENT ABDOMINAL WALL WITH SYNTHETIC SUBSTITUTE, OPEN APPROACH: ICD-10-PCS | Performed by: SURGERY

## 2018-09-14 DEVICE — VENTRALEX ST HERNIA PATCH
Type: IMPLANTABLE DEVICE | Site: UMBILICAL | Status: FUNCTIONAL
Brand: VENTRALEX ST HERNIA PATCH

## 2018-09-14 RX ORDER — MEPERIDINE HYDROCHLORIDE 25 MG/ML
INJECTION INTRAMUSCULAR; INTRAVENOUS; SUBCUTANEOUS
Status: COMPLETED
Start: 2018-09-14 | End: 2018-09-14

## 2018-09-14 RX ORDER — METOCLOPRAMIDE HYDROCHLORIDE 5 MG/ML
INJECTION INTRAMUSCULAR; INTRAVENOUS
Status: COMPLETED
Start: 2018-09-14 | End: 2018-09-14

## 2018-09-14 RX ORDER — HYDROCODONE BITARTRATE AND ACETAMINOPHEN 5; 325 MG/1; MG/1
1-2 TABLET ORAL EVERY 4 HOURS PRN
Qty: 30 TABLET | Refills: 0 | Status: SHIPPED | OUTPATIENT
Start: 2018-09-14 | End: 2018-09-25

## 2018-09-14 RX ORDER — SODIUM CHLORIDE, SODIUM LACTATE, POTASSIUM CHLORIDE, CALCIUM CHLORIDE 600; 310; 30; 20 MG/100ML; MG/100ML; MG/100ML; MG/100ML
INJECTION, SOLUTION INTRAVENOUS CONTINUOUS
Status: DISCONTINUED | OUTPATIENT
Start: 2018-09-14 | End: 2018-09-14

## 2018-09-14 RX ORDER — CEFAZOLIN SODIUM/WATER 2 G/20 ML
2 SYRINGE (ML) INTRAVENOUS ONCE
Status: COMPLETED | OUTPATIENT
Start: 2018-09-14 | End: 2018-09-14

## 2018-09-14 RX ORDER — ONDANSETRON 2 MG/ML
4 INJECTION INTRAMUSCULAR; INTRAVENOUS AS NEEDED
Status: DISCONTINUED | OUTPATIENT
Start: 2018-09-14 | End: 2018-09-14

## 2018-09-14 RX ORDER — ACETAMINOPHEN 500 MG
1000 TABLET ORAL ONCE
Status: DISCONTINUED | OUTPATIENT
Start: 2018-09-14 | End: 2018-09-14 | Stop reason: HOSPADM

## 2018-09-14 RX ORDER — HYDROCODONE BITARTRATE AND ACETAMINOPHEN 5; 325 MG/1; MG/1
1 TABLET ORAL AS NEEDED
Status: COMPLETED | OUTPATIENT
Start: 2018-09-14 | End: 2018-09-14

## 2018-09-14 RX ORDER — MEPERIDINE HYDROCHLORIDE 25 MG/ML
12.5 INJECTION INTRAMUSCULAR; INTRAVENOUS; SUBCUTANEOUS EVERY 10 MIN PRN
Status: COMPLETED | OUTPATIENT
Start: 2018-09-14 | End: 2018-09-14

## 2018-09-14 RX ORDER — ONDANSETRON 2 MG/ML
INJECTION INTRAMUSCULAR; INTRAVENOUS
Status: COMPLETED
Start: 2018-09-14 | End: 2018-09-14

## 2018-09-14 RX ORDER — HEPARIN SODIUM 5000 [USP'U]/ML
5000 INJECTION, SOLUTION INTRAVENOUS; SUBCUTANEOUS ONCE
Status: COMPLETED | OUTPATIENT
Start: 2018-09-14 | End: 2018-09-14

## 2018-09-14 RX ORDER — MORPHINE SULFATE 4 MG/ML
2 INJECTION, SOLUTION INTRAMUSCULAR; INTRAVENOUS EVERY 5 MIN PRN
Status: DISCONTINUED | OUTPATIENT
Start: 2018-09-14 | End: 2018-09-14

## 2018-09-14 RX ORDER — METOCLOPRAMIDE HYDROCHLORIDE 5 MG/ML
10 INJECTION INTRAMUSCULAR; INTRAVENOUS ONCE
Status: COMPLETED | OUTPATIENT
Start: 2018-09-14 | End: 2018-09-14

## 2018-09-14 RX ORDER — HYDROCODONE BITARTRATE AND ACETAMINOPHEN 5; 325 MG/1; MG/1
2 TABLET ORAL AS NEEDED
Status: COMPLETED | OUTPATIENT
Start: 2018-09-14 | End: 2018-09-14

## 2018-09-14 RX ORDER — ACETAMINOPHEN 500 MG
1000 TABLET ORAL ONCE AS NEEDED
Status: DISCONTINUED | OUTPATIENT
Start: 2018-09-14 | End: 2018-09-14

## 2018-09-14 RX ORDER — NALOXONE HYDROCHLORIDE 0.4 MG/ML
80 INJECTION, SOLUTION INTRAMUSCULAR; INTRAVENOUS; SUBCUTANEOUS AS NEEDED
Status: DISCONTINUED | OUTPATIENT
Start: 2018-09-14 | End: 2018-09-14

## 2018-09-14 RX ORDER — BUPIVACAINE HYDROCHLORIDE 5 MG/ML
INJECTION, SOLUTION EPIDURAL; INTRACAUDAL AS NEEDED
Status: DISCONTINUED | OUTPATIENT
Start: 2018-09-14 | End: 2018-09-14 | Stop reason: HOSPADM

## 2018-09-14 NOTE — H&P
REFERRED BY     Patient presents with umbilical hernia. Patient states that she has been having abdominal pain at the umbilicus for the past 6 weeks. She started wearing an abdominal binder which helps for the past 4 months.   She states that she delivere lower extremities  HEMATOLOGY: denies hx anemia; denies bruising or excessive bleeding  Endocrine: no weight gain or loss no hot or cold intolerance     EXAM      Blood pressure 122/72, pulse 77, resp.  rate 15, weight 167 lb, last menstrual period 07/29/20 Final   • HCT 05/11/2018 37.1  34.0 - 50.0 % Final   • PLT 05/11/2018 282.0  150.0 - 450.0 10(3)uL Final   • MCV 05/11/2018 84.3  81.0 - 100.0 fL Final   • MCH 05/11/2018 26.8* 27.0 - 33.2 pg Final   • MCHC 05/11/2018 31.8  31.0 - 37.0 g/dL Final   • RDW 0 05/12/2018 83.6  81.0 - 100.0 fL Final   • MCH 05/12/2018 27.0  27.0 - 33.2 pg Final   • MCHC 05/12/2018 32.2  31.0 - 37.0 g/dL Final   • RDW 05/12/2018 14.6  11.5 - 16.0 % Final   • RDW-SD 05/12/2018 44.2  35.1 - 46.3 fL Final   • Neutrophil Absolute Prel x10(3) uL Final   • Neutrophil % 05/13/2018 52.3  % Final   • Lymphocyte % 05/13/2018 39.5  % Final   • Monocyte % 05/13/2018 6.1  % Final   • Eosinophil % 05/13/2018 1.6  % Final   • Basophil % 05/13/2018 0.2  % Final   • Immature Granulocyte % 05/13/2018

## 2018-09-14 NOTE — OPERATIVE REPORT
[facility]    PATIENT'S NAME: Bobbi Shah   ATTENDING PHYSICIAN: Liz Bradley D.O.   OPERATING PHYSICIAN: Liz Bradley D.O.   PATIENT ACCOUNT#:   [de-identified]    LOCATION:  PACU 1404 [facility] PACU 8 EDWP 10  MEDICAL RECORD #:   [id number]       DATE OF BI applied. The patient was transported from the operative suite to Recovery in stable condition.      Dictated By Rusty Braswell D.O.  d: 09/14/2018 08:41:18  t: 09/14/2018 09:08:57  [facility] [id number]  /    cc: Sal Nickerson D.O.

## 2018-09-14 NOTE — BRIEF OP NOTE
Pre-Operative Diagnosis: Umbilical hernia without obstruction or gangrene [K42.9]     Post-Operative Diagnosis: Umbilical hernia without obstruction or gangrene [K42.9]      Procedure Performed:   [facility]   [facility]

## 2018-09-25 ENCOUNTER — OFFICE VISIT (OUTPATIENT)
Dept: SURGERY | Facility: CLINIC | Age: 33
End: 2018-09-25
Payer: COMMERCIAL

## 2018-09-25 VITALS — HEIGHT: 66 IN | HEART RATE: 82 BPM | TEMPERATURE: 98 F | WEIGHT: 159 LBS | BODY MASS INDEX: 25.55 KG/M2

## 2018-09-25 DIAGNOSIS — K42.9 UMBILICAL HERNIA WITHOUT OBSTRUCTION OR GANGRENE: Primary | ICD-10-CM

## 2018-09-25 PROCEDURE — 99024 POSTOP FOLLOW-UP VISIT: CPT | Performed by: SURGERY

## 2018-09-25 NOTE — PROGRESS NOTES
Pt is s/p umbilical herniorrhaphy with  Mesh   She is doing well no c.o of pain I  Incision is clean and dry   Plan f/u prn no exertion for 5 weeks fromnow

## 2018-11-28 ENCOUNTER — OFFICE VISIT (OUTPATIENT)
Dept: FAMILY MEDICINE CLINIC | Facility: CLINIC | Age: 33
End: 2018-11-28
Payer: COMMERCIAL

## 2018-11-28 VITALS
RESPIRATION RATE: 16 BRPM | BODY MASS INDEX: 25.55 KG/M2 | SYSTOLIC BLOOD PRESSURE: 118 MMHG | HEART RATE: 113 BPM | DIASTOLIC BLOOD PRESSURE: 76 MMHG | TEMPERATURE: 99 F | WEIGHT: 159 LBS | OXYGEN SATURATION: 98 % | HEIGHT: 66 IN

## 2018-11-28 DIAGNOSIS — J02.9 SORE THROAT: ICD-10-CM

## 2018-11-28 DIAGNOSIS — R68.89 FLU-LIKE SYMPTOMS: Primary | ICD-10-CM

## 2018-11-28 PROCEDURE — 99213 OFFICE O/P EST LOW 20 MIN: CPT | Performed by: NURSE PRACTITIONER

## 2018-11-28 RX ORDER — OSELTAMIVIR PHOSPHATE 75 MG/1
75 CAPSULE ORAL 2 TIMES DAILY
Qty: 10 CAPSULE | Refills: 0 | Status: SHIPPED | OUTPATIENT
Start: 2018-11-28 | End: 2018-11-28 | Stop reason: CLARIF

## 2018-11-28 RX ORDER — OSELTAMIVIR PHOSPHATE 75 MG/1
75 CAPSULE ORAL 2 TIMES DAILY
Qty: 10 CAPSULE | Refills: 0 | Status: SHIPPED | OUTPATIENT
Start: 2018-11-28 | End: 2018-12-03

## 2018-11-28 RX ORDER — IBUPROFEN 200 MG
600 TABLET ORAL EVERY 6 HOURS PRN
COMMUNITY

## 2018-11-28 NOTE — PROGRESS NOTES
Patient presents with:  Sore Throat: bodyaches, chills, slight cough - Today  :    HPI:   Shelbie Santamaria is a 29year old female who presents for upper respiratory symptoms for  1  days. Started suddenly. Symptoms have been worsening since onset.   Feel EXAM:   /76 (BP Location: Right arm)   Pulse 113   Temp 99.4 °F (37.4 °C) (Oral)   Resp 16   Ht 66\"   Wt 159 lb   LMP 11/14/2018 (Approximate)   SpO2 98%   Breastfeeding?  No   BMI 25.66 kg/m²   GENERAL: well developed, well nourished, in no appa The patient is asked to return in 3-4 days if sx's persist. Seek immediate medical attention for acute or worsening symptoms. The patient indicates understanding of these issues and agrees to the plan.     Meds & Refills for this Visit:  Requested Prescript · Over-the-counter cold medicines will not make the flu go away faster. But the medicines may help with coughing, sore throat, and congestion in your nose and sinuses. Don’t use a decongestant if you have high blood pressure.   · Stay home until your fever

## (undated) DEVICE — SUTURE VICRYL 3-0 SH

## (undated) DEVICE — KENDALL SCD EXPRESS SLEEVES, KNEE LENGTH, MEDIUM: Brand: KENDALL SCD

## (undated) DEVICE — GLOVE BIOGEL M SURG SZ 71/2

## (undated) DEVICE — SUTURE MONOCRYL 4-0 PS-2

## (undated) DEVICE — SOL  .9 1000ML BTL

## (undated) DEVICE — SUTURE ETHIBOND EXCEL 2-0 SH

## (undated) DEVICE — BREAST-HERNIA-PORT CDS-LF: Brand: MEDLINE INDUSTRIES, INC.

## (undated) NOTE — LETTER
October 27, 2017    Patient: Jim Handley   Date of Visit: 10/26/2017       To Whom It May Concern:    Jing Murcia was seen and treated in our emergency department on 10/26/2017. She should not return to work until cleared by ob.     If you hav

## (undated) NOTE — ED AVS SNAPSHOT
Ms. Edgard Ivory   MRN: [id number]    Department:  [facility] Emergency Department in [place]   Date of Visit:  10/26/2017           Disclosure     Insurance plans vary and the physician(s) referred by the ER may not be covered by your plan.  Please If you have been prescribed any medication(s), please fill your prescription right away and begin taking the medication(s) as directed    If the emergency physician has read X-rays, these will be re-interpreted by a radiologist.  If there is a significant

## (undated) NOTE — LETTER
[facility] [address], [address] [place] [address]    CONSENT TO DIAGNOSTIC PROCEDURE      Date: _____5/11/2018______________        A. M. Time: ________0915___________ P.M.       I authorize . ________Dr. Ashely Noel ______________________